# Patient Record
Sex: MALE | Race: OTHER | Employment: FULL TIME
[De-identification: names, ages, dates, MRNs, and addresses within clinical notes are randomized per-mention and may not be internally consistent; named-entity substitution may affect disease eponyms.]

---

## 2022-03-18 PROBLEM — E29.1 TESTICULAR HYPOFUNCTION: Status: ACTIVE | Noted: 2020-03-27

## 2022-03-19 PROBLEM — R53.82 CHRONIC FATIGUE: Status: ACTIVE | Noted: 2020-03-27

## 2022-03-19 PROBLEM — D22.9 NEVUS: Status: ACTIVE | Noted: 2020-03-27

## 2022-03-19 PROBLEM — F32.9 REACTIVE DEPRESSION: Status: ACTIVE | Noted: 2020-03-26

## 2022-03-19 PROBLEM — K59.4 LEVATOR SYNDROME: Status: ACTIVE | Noted: 2019-10-17

## 2022-03-19 PROBLEM — D23.5 DYSPLASTIC NEVUS OF TRUNK: Status: ACTIVE | Noted: 2020-01-31

## 2022-03-19 PROBLEM — N52.8 OTHER MALE ERECTILE DYSFUNCTION: Status: ACTIVE | Noted: 2020-03-27

## 2023-02-22 ENCOUNTER — OFFICE VISIT (OUTPATIENT)
Dept: FAMILY MEDICINE CLINIC | Facility: CLINIC | Age: 43
End: 2023-02-22
Payer: COMMERCIAL

## 2023-02-22 VITALS
OXYGEN SATURATION: 96 % | WEIGHT: 182.2 LBS | HEART RATE: 83 BPM | DIASTOLIC BLOOD PRESSURE: 84 MMHG | HEIGHT: 70 IN | BODY MASS INDEX: 26.08 KG/M2 | SYSTOLIC BLOOD PRESSURE: 131 MMHG | RESPIRATION RATE: 18 BRPM | TEMPERATURE: 98.7 F

## 2023-02-22 DIAGNOSIS — F32.9 REACTIVE DEPRESSION: ICD-10-CM

## 2023-02-22 DIAGNOSIS — K21.00 GASTRO-ESOPHAGEAL REFLUX DISEASE WITH ESOPHAGITIS, WITHOUT BLEEDING: ICD-10-CM

## 2023-02-22 DIAGNOSIS — L30.1 ECZEMA, DYSHIDROTIC: Primary | ICD-10-CM

## 2023-02-22 DIAGNOSIS — Z88.9 HX OF SEASONAL ALLERGIES: ICD-10-CM

## 2023-02-22 DIAGNOSIS — N52.8 OTHER MALE ERECTILE DYSFUNCTION: ICD-10-CM

## 2023-02-22 DIAGNOSIS — K59.4 LEVATOR SYNDROME: ICD-10-CM

## 2023-02-22 PROCEDURE — 99214 OFFICE O/P EST MOD 30 MIN: CPT | Performed by: FAMILY MEDICINE

## 2023-02-22 RX ORDER — TADALAFIL 5 MG/1
5 TABLET ORAL DAILY
Qty: 90 TABLET | Refills: 3 | Status: SHIPPED | OUTPATIENT
Start: 2023-02-22

## 2023-02-22 RX ORDER — CLOBETASOL PROPIONATE 0.5 MG/G
OINTMENT TOPICAL
Qty: 60 G | Refills: 3 | Status: SHIPPED | OUTPATIENT
Start: 2023-02-22

## 2023-02-22 RX ORDER — PREDNISONE 20 MG/1
TABLET ORAL
Qty: 30 TABLET | Refills: 0 | Status: SHIPPED | OUTPATIENT
Start: 2023-02-22

## 2023-02-22 RX ORDER — DIAZEPAM 2 MG/1
2 TABLET ORAL DAILY PRN
Qty: 30 TABLET | Refills: 1 | Status: SHIPPED | OUTPATIENT
Start: 2023-02-22 | End: 2024-02-17

## 2023-02-22 RX ORDER — ALBUTEROL SULFATE 90 UG/1
2 AEROSOL, METERED RESPIRATORY (INHALATION) EVERY 6 HOURS PRN
Qty: 18 G | Refills: 5 | Status: SHIPPED | OUTPATIENT
Start: 2023-02-22

## 2023-02-22 RX ORDER — ESOMEPRAZOLE MAGNESIUM 40 MG/1
40 CAPSULE, DELAYED RELEASE ORAL DAILY
Qty: 30 CAPSULE | Refills: 5 | Status: SHIPPED | OUTPATIENT
Start: 2023-02-22

## 2023-02-22 SDOH — ECONOMIC STABILITY: FOOD INSECURITY: WITHIN THE PAST 12 MONTHS, THE FOOD YOU BOUGHT JUST DIDN'T LAST AND YOU DIDN'T HAVE MONEY TO GET MORE.: NEVER TRUE

## 2023-02-22 SDOH — ECONOMIC STABILITY: FOOD INSECURITY: WITHIN THE PAST 12 MONTHS, YOU WORRIED THAT YOUR FOOD WOULD RUN OUT BEFORE YOU GOT MONEY TO BUY MORE.: NEVER TRUE

## 2023-02-22 SDOH — ECONOMIC STABILITY: INCOME INSECURITY: HOW HARD IS IT FOR YOU TO PAY FOR THE VERY BASICS LIKE FOOD, HOUSING, MEDICAL CARE, AND HEATING?: NOT HARD AT ALL

## 2023-02-22 SDOH — ECONOMIC STABILITY: HOUSING INSECURITY
IN THE LAST 12 MONTHS, WAS THERE A TIME WHEN YOU DID NOT HAVE A STEADY PLACE TO SLEEP OR SLEPT IN A SHELTER (INCLUDING NOW)?: NO

## 2023-02-22 ASSESSMENT — ENCOUNTER SYMPTOMS
COLOR CHANGE: 1
RESPIRATORY NEGATIVE: 1

## 2023-02-22 ASSESSMENT — PATIENT HEALTH QUESTIONNAIRE - PHQ9
SUM OF ALL RESPONSES TO PHQ QUESTIONS 1-9: 0
1. LITTLE INTEREST OR PLEASURE IN DOING THINGS: 0
SUM OF ALL RESPONSES TO PHQ QUESTIONS 1-9: 0
SUM OF ALL RESPONSES TO PHQ9 QUESTIONS 1 & 2: 0
2. FEELING DOWN, DEPRESSED OR HOPELESS: 0

## 2023-02-22 NOTE — PROGRESS NOTES
Ladell Scheuermann (:  1980) is a 40 y.o. male,Established patient, here for evaluation of the following chief complaint(s):  Rash        ASSESSMENT/PLAN:  1. Eczema, dyshidrotic  -     predniSONE (DELTASONE) 20 MG tablet; Take 60 mg for 5 days; 40 mg for 5 days and 20 mg for 5 days. , Disp-30 tablet, R-0Normal  -     clobetasol (TEMOVATE) 0.05 % ointment; Apply topically 3 times daily. , Disp-60 g, R-3, Normal  2. Levator syndrome  -     diazePAM (VALIUM) 2 MG tablet; Take 1 tablet by mouth daily as needed (levator ani syndrome) for up to 360 days. Max Daily Amount: 2 mg, Disp-30 tablet, R-1Normal    Increase potency of steroid cream  15 days of prednisone. Recheck in 3 weeks: Return for already has follow up. Subjective   SUBJECTIVE/OBJECTIVE:  HPI  Rash:  Went to [facility] and was told rash was caused by stress. Dyshidrosis is what he was told the rash was. Was given kenalog topical cream, does come and go but has never fully resolved. Hands burn, itch and sting. Levator Ani:  Taking valium 1-2 per week. (Long-standing history of anal/rectal pain. Full work-up done several years ago. Discovered to be levator ani syndrome. Spasms happen 1-4 times per month. Other than those times absolutely no pain. Resolve with Valium). Review of Systems   Constitutional: Negative. Respiratory: Negative. Cardiovascular: Negative. Skin:  Positive for color change and rash. Negative for wound. Objective   Physical Exam  Vitals and nursing note reviewed. Constitutional:       General: He is not in acute distress. Appearance: He is not ill-appearing or toxic-appearing. Cardiovascular:      Rate and Rhythm: Normal rate and regular rhythm. Skin:     Findings: Erythema and rash present. Comments: See pictures   Neurological:      Mental Status: He is alert.               Vitals:    23 1623   BP: 131/84   Pulse: 83   Resp: 18   Temp: 98.7 °F (37.1 °C)   SpO2: 96% On this date 2/22/2023 I have spent 30 minutes reviewing previous notes, test results and face to face with the patient discussing the diagnosis and importance of compliance with the treatment plan as well as documenting on the day of the visit. An electronic signature was used to authenticate this note.     --Kamila Lundberg MD

## 2023-03-13 ENCOUNTER — OFFICE VISIT (OUTPATIENT)
Dept: FAMILY MEDICINE CLINIC | Facility: CLINIC | Age: 43
End: 2023-03-13
Payer: COMMERCIAL

## 2023-03-13 VITALS
WEIGHT: 184.2 LBS | HEART RATE: 69 BPM | TEMPERATURE: 97.4 F | SYSTOLIC BLOOD PRESSURE: 116 MMHG | RESPIRATION RATE: 18 BRPM | BODY MASS INDEX: 26.37 KG/M2 | DIASTOLIC BLOOD PRESSURE: 85 MMHG | HEIGHT: 70 IN | OXYGEN SATURATION: 99 %

## 2023-03-13 DIAGNOSIS — F32.9 REACTIVE DEPRESSION: ICD-10-CM

## 2023-03-13 DIAGNOSIS — L30.1 ECZEMA, DYSHIDROTIC: ICD-10-CM

## 2023-03-13 DIAGNOSIS — K59.4 LEVATOR SYNDROME: ICD-10-CM

## 2023-03-13 DIAGNOSIS — Z00.00 WELL ADULT EXAM: Primary | ICD-10-CM

## 2023-03-13 PROCEDURE — 99396 PREV VISIT EST AGE 40-64: CPT | Performed by: FAMILY MEDICINE

## 2023-03-13 RX ORDER — CLOBETASOL PROPIONATE 0.5 MG/G
OINTMENT TOPICAL
Qty: 60 G | Refills: 3 | Status: SHIPPED | OUTPATIENT
Start: 2023-03-13

## 2023-03-13 RX ORDER — CLOBETASOL PROPIONATE 0.5 MG/G
OINTMENT TOPICAL
Qty: 60 G | Refills: 11 | Status: CANCELLED | OUTPATIENT
Start: 2023-03-13

## 2023-03-13 SDOH — ECONOMIC STABILITY: INCOME INSECURITY: HOW HARD IS IT FOR YOU TO PAY FOR THE VERY BASICS LIKE FOOD, HOUSING, MEDICAL CARE, AND HEATING?: NOT HARD AT ALL

## 2023-03-13 SDOH — ECONOMIC STABILITY: FOOD INSECURITY: WITHIN THE PAST 12 MONTHS, YOU WORRIED THAT YOUR FOOD WOULD RUN OUT BEFORE YOU GOT MONEY TO BUY MORE.: NEVER TRUE

## 2023-03-13 SDOH — ECONOMIC STABILITY: FOOD INSECURITY: WITHIN THE PAST 12 MONTHS, THE FOOD YOU BOUGHT JUST DIDN'T LAST AND YOU DIDN'T HAVE MONEY TO GET MORE.: NEVER TRUE

## 2023-03-13 ASSESSMENT — PATIENT HEALTH QUESTIONNAIRE - PHQ9
SUM OF ALL RESPONSES TO PHQ QUESTIONS 1-9: 0
SUM OF ALL RESPONSES TO PHQ QUESTIONS 1-9: 0
3. TROUBLE FALLING OR STAYING ASLEEP: 0
9. THOUGHTS THAT YOU WOULD BE BETTER OFF DEAD, OR OF HURTING YOURSELF: 0
6. FEELING BAD ABOUT YOURSELF - OR THAT YOU ARE A FAILURE OR HAVE LET YOURSELF OR YOUR FAMILY DOWN: 0
SUM OF ALL RESPONSES TO PHQ9 QUESTIONS 1 & 2: 0
SUM OF ALL RESPONSES TO PHQ QUESTIONS 1-9: 0
7. TROUBLE CONCENTRATING ON THINGS, SUCH AS READING THE NEWSPAPER OR WATCHING TELEVISION: 0
2. FEELING DOWN, DEPRESSED OR HOPELESS: 0
1. LITTLE INTEREST OR PLEASURE IN DOING THINGS: 0
SUM OF ALL RESPONSES TO PHQ QUESTIONS 1-9: 0
SUM OF ALL RESPONSES TO PHQ QUESTIONS 1-9: 0
10. IF YOU CHECKED OFF ANY PROBLEMS, HOW DIFFICULT HAVE THESE PROBLEMS MADE IT FOR YOU TO DO YOUR WORK, TAKE CARE OF THINGS AT HOME, OR GET ALONG WITH OTHER PEOPLE: 0
4. FEELING TIRED OR HAVING LITTLE ENERGY: 0
2. FEELING DOWN, DEPRESSED OR HOPELESS: 0
5. POOR APPETITE OR OVEREATING: 0
1. LITTLE INTEREST OR PLEASURE IN DOING THINGS: 0
8. MOVING OR SPEAKING SO SLOWLY THAT OTHER PEOPLE COULD HAVE NOTICED. OR THE OPPOSITE, BEING SO FIGETY OR RESTLESS THAT YOU HAVE BEEN MOVING AROUND A LOT MORE THAN USUAL: 0
SUM OF ALL RESPONSES TO PHQ9 QUESTIONS 1 & 2: 0
SUM OF ALL RESPONSES TO PHQ QUESTIONS 1-9: 0

## 2023-03-13 ASSESSMENT — ENCOUNTER SYMPTOMS
GASTROINTESTINAL NEGATIVE: 1
RESPIRATORY NEGATIVE: 1

## 2023-03-13 NOTE — PROGRESS NOTES
3/13/2023    Saeid Trotter (:  1980) is a 40 y.o. male, here for a preventive medicine evaluation. Underlying anxiety (now controlled) (s/p death of his first wife in 2019 from pulm ebolism); levator syndrome; eczema;     Doing well lately; Got  last year; new baby . COVID:  No infections. Declines vaccine at this time. Taking valium 1-2 per week. (Long-standing history of anal/rectal pain. Full work-up done several years ago. Discovered to be levator ani syndrome. Spasms happen 1-4 times per month. Other than those times absolutely no pain. Resolve with Valium). Allergies Seasonal  Allegra in the morning; Zyrtec at night. Occasional albuterol. Cholesterol checked at work; (he will bring us records)  WNL: LDL a little high at 103. BGL: wnl; Triglycerides; wnl. Patient Active Problem List   Diagnosis    Testicular hypofunction    Chronic fatigue    Nevus    Hx of seasonal allergies    Other male erectile dysfunction    Levator syndrome    Reactive depression    Dysplastic nevus of trunk    Muscle spasm    GI bleed    Eczema, dyshidrotic    Well adult exam       Review of Systems   Constitutional: Negative. Respiratory: Negative. Cardiovascular: Negative. Gastrointestinal: Negative. Musculoskeletal: Negative. Skin: Negative. Neurological:  Negative for headaches. Psychiatric/Behavioral: Negative. Prior to Visit Medications    Medication Sig Taking? Authorizing Provider   clobetasol (TEMOVATE) 0.05 % ointment Apply topically 3 times daily. Yes German Clark MD   diazePAM (VALIUM) 2 MG tablet Take 1 tablet by mouth daily as needed (levator ani syndrome) for up to 360 days.  Max Daily Amount: 2 mg Yes German Clark MD   tadalafil (CIALIS) 5 MG tablet Take 1 tablet by mouth daily Yes German Clark MD   esomeprazole (NEXIUM) 40 MG delayed release capsule Take 1 capsule by mouth daily Yes German Clark MD   albuterol sulfate HFA (PROVENTIL;VENTOLIN;PROAIR) 108 (90 Base) MCG/ACT inhaler Inhale 2 puffs into the lungs every 6 hours as needed for Wheezing Yes Patricia Murdock MD   cetirizine (ZYRTEC) 10 MG tablet Take by mouth Yes Ar Automatic Reconciliation   fexofenadine (ALLEGRA) 180 MG tablet Take by mouth daily Yes Ar Automatic Reconciliation   moxifloxacin (VIGAMOX) 0.5 % ophthalmic solution Apply 1 drop to eye 3 times daily Yes Ar Automatic Reconciliation        No Known Allergies    Past Medical History:   Diagnosis Date    Asthma     GI bleed     Hx of seasonal allergies     Muscle spasm        Past Surgical History:   Procedure Laterality Date    GI      hemorroidectomy, egd       Social History     Socioeconomic History    Marital status:      Spouse name: Not on file    Number of children: Not on file    Years of education: Not on file    Highest education level: Not on file   Occupational History    Not on file   Tobacco Use    Smoking status: Never    Smokeless tobacco: Never   Vaping Use    Vaping Use: Never used   Substance and Sexual Activity    Alcohol use: Yes     Alcohol/week: 1.0 standard drink    Drug use: Never    Sexual activity: Not on file     Comment:    Other Topics Concern    Not on file   Social History Narrative    Not on file     Social Determinants of Health     Financial Resource Strain: Low Risk     Difficulty of Paying Living Expenses: Not hard at all   Food Insecurity: No Food Insecurity    Worried About [address] in the Last Year: Never true    [address] in the Last Year: Never true   Transportation Needs: Unknown    Lack of Transportation (Medical): Not on file    Lack of Transportation (Non-Medical):  No   Physical Activity: Not on file   Stress: Not on file   Social Connections: Not on file   Intimate Partner Violence: Not on file   Housing Stability: Unknown    Unable to Pay for Housing in the Last Year: Not on file    Number of Places Lived in the Last Year: Not on file Unstable Housing in the Last Year: No        Family History   Problem Relation Age of Onset    No Known Problems Mother     No Known Problems Father     Cancer Maternal Grandfather         colon ca at age 72       ADVANCE DIRECTIVE: N, <no information>    Vitals:    03/13/23 1013   BP: 116/85   Pulse: 69   Resp: 18   Temp: 97.4 °F (36.3 °C)   TempSrc: Temporal   SpO2: 99%  Comment: room air sitting   Weight: 184 lb 3.2 oz (83.6 kg)   Height: 5' 10\" (1.778 m)     Estimated body mass index is 26.43 kg/m² as calculated from the following:    Height as of this encounter: 5' 10\" (1.778 m). Weight as of this encounter: 184 lb 3.2 oz (83.6 kg). Physical Exam  Vitals and nursing note reviewed. Constitutional:       General: He is not in acute distress. Appearance: He is not ill-appearing or toxic-appearing. HENT:      Head: Normocephalic and atraumatic. Cardiovascular:      Rate and Rhythm: Normal rate and regular rhythm. Pulmonary:      Effort: Pulmonary effort is normal.      Breath sounds: Normal breath sounds. Skin:     General: Skin is warm. Capillary Refill: Capillary refill takes less than 2 seconds. Neurological:      Mental Status: He is alert. Psychiatric:         Mood and Affect: Mood normal.         Behavior: Behavior normal.         Thought Content: Thought content normal.         Judgment: Judgment normal.       No flowsheet data found.     Lab Results   Component Value Date/Time    CHOL 154 12/03/2019 09:06 AM    TRIG 55 12/03/2019 09:06 AM    HDL 48 12/03/2019 09:06 AM    LDLCALC 95 12/03/2019 09:06 AM    GLUCOSE 83 12/03/2019 09:06 AM       The ASCVD Risk score (Gwyn KILGORE, et al., 2019) failed to calculate for the following reasons:    Cannot find a previous HDL lab    Cannot find a previous total cholesterol lab    Unable to determine if patient is Non-     Immunization History   Administered Date(s) Administered    DTaP (Infanrix) 1980, 03/03/1982, 10/07/1982, 06/19/1984, 08/07/1986    Hepatitis B Adult (Recombivax HB) 03/22/2005, 04/26/2005, 09/27/2005    Influenza Virus Vaccine 10/01/2019    MMR 06/14/1984    Polio IPV (IPOL) 1981, 03/03/1982, 06/14/1984, 06/19/1984, 08/07/1986       Health Maintenance   Topic Date Due    COVID-19 Vaccine (1) Never done    Varicella vaccine (1 of 2 - 2-dose childhood series) Never done    DTaP/Tdap/Td vaccine (6 - Tdap) 10/24/1992    HIV screen  Never done    Hepatitis C screen  Never done    Flu vaccine (1) 08/01/2022    Diabetes screen  03/13/2024 (Originally 10/24/2016)    Depression Monitoring  02/22/2024    Lipids  01/25/2027    Hepatitis A vaccine  Aged Out    Hib vaccine  Aged Out    Meningococcal (ACWY) vaccine  Aged Out    Pneumococcal 0-64 years Vaccine  Aged Out       Assessment & Plan   Well adult exam  Eczema, dyshidrotic  -     clobetasol (TEMOVATE) 0.05 % ointment; Apply topically 3 times daily. , Disp-60 g, R-3, Normal  Levator syndrome  Reactive depression      Plan:  No change in medication. Continue clobetasol as needed for eczema. He will get a copy of his labs from work and send them to us. Otherwise, no concerns.       Return in about 1 year (around 3/13/2024) for wait for new DrShraddha or give Drs list.         --Manuelito Reyes MD

## 2023-04-12 PROBLEM — Z00.00 WELL ADULT EXAM: Status: RESOLVED | Noted: 2023-03-13 | Resolved: 2023-04-12

## 2023-06-05 ENCOUNTER — NURSE TRIAGE (OUTPATIENT)
Dept: OTHER | Facility: CLINIC | Age: 43
End: 2023-06-05

## 2023-06-05 NOTE — TELEPHONE ENCOUNTER
Location of patient: [place]    Received call from [facility] at [facility] with [employer]. Subjective: Caller states \"diarrhea\"     Current Symptoms: diarrhea on/off hx acid reflux and pain feels bloated , no appetite , sharp pains on/off , feels like has lost weight , chills hx gi bleed also in the past feels is staying hydrated     Pt is un established     Onset: 7 days and has had loose stools for even longer     Associated Symptoms: NA    Pain Severity: 6/10    Temperature: unsure    What has been tried: peptobismal    LMP: NA Pregnant: NA    Recommended disposition: See in Office Today    Care advice provided, patient verbalizes understanding; denies any other questions or concerns; instructed to call back for any new or worsening symptoms. Patient/Caller agrees with recommended disposition; writer provided warm transfer to [facility] for appointment scheduling    Attention Provider: Thank you for allowing me to participate in the care of your patient. The patient was connected to triage in response to information provided to the ECC/PSC. Please do not respond through this encounter as the response is not directed to a shared pool.       Reason for Disposition   SEVERE diarrhea (e.g., 7 or more times / day more than normal) and present > 24 hours (1 day)    Protocols used: Diarrhea-ADULT-OH

## 2024-09-25 DIAGNOSIS — M25.561 RIGHT KNEE PAIN, UNSPECIFIED CHRONICITY: Primary | ICD-10-CM

## 2024-10-08 ENCOUNTER — TELEPHONE (OUTPATIENT)
Dept: ORTHOPEDIC SURGERY | Age: 44
End: 2024-10-08

## 2024-10-08 NOTE — TELEPHONE ENCOUNTER
Pt called and told he has a WC NP appt with Dr. De Los Santos on Monday. Please call pt to confirm

## 2024-10-08 NOTE — TELEPHONE ENCOUNTER
Called and LVM for pt to confirm that his appt will be 10/14 at 9:30am. Pt will need to be at [facility] at 8[facility].

## 2024-10-11 DIAGNOSIS — M25.561 RIGHT KNEE PAIN, UNSPECIFIED CHRONICITY: ICD-10-CM

## 2024-10-14 ENCOUNTER — OFFICE VISIT (OUTPATIENT)
Dept: ORTHOPEDIC SURGERY | Age: 44
End: 2024-10-14
Payer: COMMERCIAL

## 2024-10-14 VITALS — WEIGHT: 175 LBS | BODY MASS INDEX: 24.5 KG/M2 | HEIGHT: 71 IN

## 2024-10-14 DIAGNOSIS — M71.21 SYNOVIAL CYST OF RIGHT KNEE: ICD-10-CM

## 2024-10-14 DIAGNOSIS — S83.231A COMPLEX TEAR OF MEDIAL MENISCUS, CURRENT INJURY, RIGHT KNEE, INITIAL ENCOUNTER: Primary | ICD-10-CM

## 2024-10-14 DIAGNOSIS — M94.261 CHONDROMALACIA OF RIGHT KNEE: ICD-10-CM

## 2024-10-14 DIAGNOSIS — M22.41 CHONDROMALACIA OF RIGHT PATELLA: ICD-10-CM

## 2024-10-14 PROCEDURE — 99204 OFFICE O/P NEW MOD 45 MIN: CPT | Performed by: ORTHOPAEDIC SURGERY

## 2024-10-14 PROCEDURE — 99366 TEAM CONF W/PAT BY HC PROF: CPT | Performed by: ORTHOPAEDIC SURGERY

## 2024-10-14 RX ORDER — SILDENAFIL 50 MG/1
TABLET, FILM COATED ORAL
COMMUNITY

## 2024-10-14 RX ORDER — BACLOFEN 10 MG/1
TABLET ORAL
COMMUNITY
Start: 2024-09-01

## 2024-10-14 RX ORDER — BUPROPION HYDROCHLORIDE 150 MG/1
TABLET ORAL
COMMUNITY
Start: 2024-09-01

## 2024-10-14 NOTE — PROGRESS NOTES
Lachman,  negative anterior drawer,   negative posterior drawer  negative pivot.   Good tibial step-off,   No varus or valgus laxity at 0 or 30 degrees.   Negative lateral joint line tenderness   negative lateral Franklin.   Positive medial joint line tenderness  Positive medial Franklin.   No evidence of any posterolateral instability.   No patellofemoral pain.   Negative compression,   negative apprehension  no effusion.   Calves Are non-tender,  neurovascularly patient is intact.   Negative Homans.   MPFL is non-tender.   Patient Can fully extend the knee.   Good quad tone  No erythema.   Negative Dial test.            Data Reviewed:          XR: AP standing PA 45 degree weightbearing lateral and sunrise views both knees   Clinical Indication    ICD-10-CM    1. Complex tear of medial meniscus, current injury, right knee, initial encounter  S83.231A       2. Chondromalacia of right knee  M94.261       3. Chondromalacia of right patella  M22.41       4. Synovial cyst of right knee  M71.21          Report: AP standing PA 45 degree weightbearing lateral and sunrise views both knees demonstrate a preserved joint space in all 3 compartments.  No fracture.  No dislocation.    Impression: As above   RAPHAEL GARCIA JR, MD     MRI right knee dated 9/30/2024 demonstrates the ACL PCL and lateral meniscus to be intact.  There is a complex tear of the posterior horn of the medial meniscus.  There is a bone bruise in the medial femoral condyle.  There is chondromalacia in the medial and patellofemoral compartments.  There is a cyst posterior to the PCL           Impression:   1. Complex tear of medial meniscus, current injury, right knee, initial encounter    2. Chondromalacia of right knee    3. Chondromalacia of right patella    4. Synovial cyst of right knee           Plan:   I discussed the problem with the patient.  I discussed nonoperative versus operative intervention including injections.  In my opinion the patient is not

## 2024-10-17 ENCOUNTER — TELEPHONE (OUTPATIENT)
Dept: ORTHOPEDIC SURGERY | Age: 44
End: 2024-10-17

## 2024-10-22 DIAGNOSIS — M94.261 CHONDROMALACIA OF RIGHT KNEE: ICD-10-CM

## 2024-10-22 DIAGNOSIS — M22.41 CHONDROMALACIA OF RIGHT PATELLA: ICD-10-CM

## 2024-10-22 DIAGNOSIS — S83.231A COMPLEX TEAR OF MEDIAL MENISCUS, CURRENT INJURY, RIGHT KNEE, INITIAL ENCOUNTER: Primary | ICD-10-CM

## 2024-10-22 DIAGNOSIS — M71.21 SYNOVIAL CYST OF RIGHT KNEE: ICD-10-CM

## 2024-10-22 NOTE — PERIOP NOTE
Please drink 32 ounces of non-caffeinated clear liquids 2 hours prior to your arrival to avoid dehydration.        Patient verified name and .  Order for consent not found in EHR patient verifies procedure.   Type 1B surgery, Phone assessment complete.  Orders not received.  Labs per surgeon: none  Labs per anesthesia protocol: none    Patient answered medical/surgical history questions at their best of ability. All prior to admission medications documented in EPIC.    Patient instructed to continue taking all prescription medications up to the day of surgery but to take only the following medications the day of surgery according to anesthesia guidelines with a small sip of water: Albuterol (Ventolin/ Pro-air) use and bring, bupropion (Wellbutrin),  Also, patient is requested to take 2 Tylenol in the morning and then again before bed on the day before surgery. Regular or extra strength may be used.       Patient informed that all vitamins and supplements should be held 7 days prior to surgery and NSAIDS 5 days prior to surgery. Prescription meds to hold:None    Patient instructed on the following:    > Arrive at [facility], time of arrival to be called the day before by 1700  > NPO after midnight, unless otherwise indicated, including gum, mints, and ice chips  > Responsible adult must drive patient to the hospital, stay during surgery, and patient will need supervision 24 hours after anesthesia  > Use non moisturizing soap in shower the night before surgery and on the morning of surgery  > All piercings must be removed prior to arrival.    > Leave all valuables (money and jewelry) at home but bring insurance card and ID on DOS.   > You may be required to pay a deductible or co-pay on the day of your procedure. You can pre-pay by calling [phone] if your surgery is at the [facility] or [phone] if your surgery is at the [facility].  > Do not wear make-up, nail polish, lotions, cologne, perfumes,

## 2024-10-22 NOTE — H&P
Subjective:     Patient is a 43 y.o. MALE WITH RIGHT KNEE PAIN    SEE OFFICE NOTE.    Patient Active Problem List    Diagnosis Date Noted    Complex tear of medial meniscus of right knee as current injury 10/22/2024    Chondromalacia of right knee 10/22/2024    Chondromalacia of right patella 10/22/2024    Synovial cyst of right knee 10/22/2024    Eczema, dyshidrotic 03/13/2023    Testicular hypofunction 03/27/2020    Chronic fatigue 03/27/2020    Nevus 03/27/2020    Other male erectile dysfunction 03/27/2020    Reactive depression 03/26/2020    Dysplastic nevus of trunk 01/31/2020    Levator syndrome 10/17/2019    Hx of seasonal allergies     Muscle spasm     GI bleed      Past Medical History:   Diagnosis Date    Asthma     GI bleed     Hx of seasonal allergies     Muscle spasm       Past Surgical History:   Procedure Laterality Date    EYE SURGERY  2002    lasix    GI      hemorroidectomy, egd      Prior to Admission medications    Medication Sig Start Date End Date Taking? Authorizing Provider   baclofen (LIORESAL) 10 MG tablet  9/1/24   Mary Polanco MD   buPROPion (WELLBUTRIN XL) 150 MG extended release tablet  9/1/24   Mary Polanco MD   sildenafil (VIAGRA) 50 MG tablet TAKE ONE-HALF TO ONE TABLET BY MOUTH ONE TIME DAILY AS NEEDED FOR ERECTILE DYSFUNCTION USE IN ADDITION TO CIALIS 5MG DAILY    Mary Polanco MD   clobetasol (TEMOVATE) 0.05 % ointment Apply topically 3 times daily. 3/13/23   Robe Echeverria MD   tadalafil (CIALIS) 5 MG tablet Take 1 tablet by mouth daily 2/22/23   Robe Echeverria MD   esomeprazole (NEXIUM) 40 MG delayed release capsule Take 1 capsule by mouth daily 2/22/23   Robe Echeverria MD   albuterol sulfate HFA (PROVENTIL;VENTOLIN;PROAIR) 108 (90 Base) MCG/ACT inhaler Inhale 2 puffs into the lungs every 6 hours as needed for Wheezing 2/22/23   Robe Echeverria MD   cetirizine (ZYRTEC) 10 MG tablet Take by mouth    Automatic Reconciliation, Ar   fexofenadine (ALLEGRA)

## 2024-10-24 ENCOUNTER — PREP FOR PROCEDURE (OUTPATIENT)
Age: 44
End: 2024-10-24

## 2024-10-24 DIAGNOSIS — S83.231A COMPLEX TEAR OF MEDIAL MENISCUS, CURRENT INJURY, RIGHT KNEE, INITIAL ENCOUNTER: Primary | ICD-10-CM

## 2024-10-24 RX ORDER — SODIUM CHLORIDE 0.9 % (FLUSH) 0.9 %
5-40 SYRINGE (ML) INJECTION EVERY 12 HOURS SCHEDULED
Status: CANCELLED | OUTPATIENT
Start: 2024-10-24

## 2024-10-24 RX ORDER — SODIUM CHLORIDE 9 MG/ML
INJECTION, SOLUTION INTRAVENOUS PRN
Status: CANCELLED | OUTPATIENT
Start: 2024-10-24

## 2024-10-24 RX ORDER — SODIUM CHLORIDE 0.9 % (FLUSH) 0.9 %
5-40 SYRINGE (ML) INJECTION PRN
Status: CANCELLED | OUTPATIENT
Start: 2024-10-24

## 2024-10-24 NOTE — BRIEF OP NOTE
BRIEF OPERATIVE NOTE    Date of Procedure: 10/25/2024    Preoperative Diagnosis:  MEDIAL MENISCAL TEAR RIGHT KNEE      CHONDROMALACIA RIGHT KNEE      PATELLOFEMORAL CHONDROMALACIA RIGHT KNEE      GANGLION CYST RIGHT KNEE    Postoperative Diagnosis:  MEDIAL MENISCAL TEAR RIGHT KNEE      CHONDROMALACIA RIGHT KNEE            GANGLION CYST RIGHT KNEE    Procedure(s)   ARTHROSCOPY RIGHT KNEE PARTIAL MEDIAL MENISECTOMY, DEBRIDEMENT GANGLION CYST    Surgeons and Role:     * Allen De Los Santos Jr., MD - Primary         Assistant Staff: NONE    Surgical Staff:  Circulator: (Unknown)  Scrub Person First: (Unknown)  Scrub Person Second: (Unknown)      * Missing procedure start or end time(s) *     Anesthesia:  GENERAL    Estimated Blood Loss: 5 cc.          Complications: NONE      TOURNIQUET:  14 MINUTES    ALLEN DE LOS SANTOS JR, MD

## 2024-10-25 ENCOUNTER — ANESTHESIA EVENT (OUTPATIENT)
Dept: SURGERY | Age: 44
End: 2024-10-25

## 2024-10-25 ENCOUNTER — ANESTHESIA (OUTPATIENT)
Dept: SURGERY | Age: 44
End: 2024-10-25

## 2024-10-25 ENCOUNTER — HOSPITAL ENCOUNTER (OUTPATIENT)
Age: 44
Setting detail: OUTPATIENT SURGERY
Discharge: HOME | End: 2024-10-25
Attending: ORTHOPAEDIC SURGERY | Admitting: ORTHOPAEDIC SURGERY

## 2024-10-25 VITALS
TEMPERATURE: 97.8 F | SYSTOLIC BLOOD PRESSURE: 122 MMHG | OXYGEN SATURATION: 98 % | RESPIRATION RATE: 16 BRPM | WEIGHT: 175.04 LBS | BODY MASS INDEX: 24.51 KG/M2 | HEIGHT: 71 IN | DIASTOLIC BLOOD PRESSURE: 75 MMHG | HEART RATE: 88 BPM

## 2024-10-25 DIAGNOSIS — S83.231A COMPLEX TEAR OF MEDIAL MENISCUS, CURRENT INJURY, RIGHT KNEE, INITIAL ENCOUNTER: ICD-10-CM

## 2024-10-25 PROBLEM — M71.21 SYNOVIAL CYST OF RIGHT KNEE: Status: RESOLVED | Noted: 2024-10-22 | Resolved: 2024-10-25

## 2024-10-25 PROBLEM — M67.461 GANGLION OF RIGHT KNEE: Status: ACTIVE | Noted: 2024-10-25

## 2024-10-25 PROCEDURE — 6370000000 HC RX 637 (ALT 250 FOR IP): Performed by: SURGERY

## 2024-10-25 PROCEDURE — 7100000010 HC PHASE II RECOVERY - FIRST 15 MIN: Performed by: ORTHOPAEDIC SURGERY

## 2024-10-25 PROCEDURE — 3600000014 HC SURGERY LEVEL 4 ADDTL 15MIN: Performed by: ORTHOPAEDIC SURGERY

## 2024-10-25 PROCEDURE — 6360000002 HC RX W HCPCS: Performed by: ORTHOPAEDIC SURGERY

## 2024-10-25 PROCEDURE — 6360000002 HC RX W HCPCS: Performed by: ANESTHESIOLOGY

## 2024-10-25 PROCEDURE — 3600000004 HC SURGERY LEVEL 4 BASE: Performed by: ORTHOPAEDIC SURGERY

## 2024-10-25 PROCEDURE — 3700000001 HC ADD 15 MINUTES (ANESTHESIA): Performed by: ORTHOPAEDIC SURGERY

## 2024-10-25 PROCEDURE — 7100000000 HC PACU RECOVERY - FIRST 15 MIN: Performed by: ORTHOPAEDIC SURGERY

## 2024-10-25 PROCEDURE — 29881 ARTHRS KNE SRG MNISECTMY M/L: CPT | Performed by: ORTHOPAEDIC SURGERY

## 2024-10-25 PROCEDURE — 2500000003 HC RX 250 WO HCPCS: Performed by: NURSE ANESTHETIST, CERTIFIED REGISTERED

## 2024-10-25 PROCEDURE — 6360000002 HC RX W HCPCS: Performed by: NURSE ANESTHETIST, CERTIFIED REGISTERED

## 2024-10-25 PROCEDURE — 7100000001 HC PACU RECOVERY - ADDTL 15 MIN: Performed by: ORTHOPAEDIC SURGERY

## 2024-10-25 PROCEDURE — 2580000003 HC RX 258: Performed by: ORTHOPAEDIC SURGERY

## 2024-10-25 PROCEDURE — 7100000011 HC PHASE II RECOVERY - ADDTL 15 MIN: Performed by: ORTHOPAEDIC SURGERY

## 2024-10-25 PROCEDURE — 2709999900 HC NON-CHARGEABLE SUPPLY: Performed by: ORTHOPAEDIC SURGERY

## 2024-10-25 PROCEDURE — 3700000000 HC ANESTHESIA ATTENDED CARE: Performed by: ORTHOPAEDIC SURGERY

## 2024-10-25 RX ORDER — FENTANYL CITRATE 50 UG/ML
100 INJECTION, SOLUTION INTRAMUSCULAR; INTRAVENOUS
Status: DISCONTINUED | OUTPATIENT
Start: 2024-10-25 | End: 2024-10-25 | Stop reason: HOSPADM

## 2024-10-25 RX ORDER — FENTANYL CITRATE 50 UG/ML
INJECTION, SOLUTION INTRAMUSCULAR; INTRAVENOUS
Status: DISCONTINUED | OUTPATIENT
Start: 2024-10-25 | End: 2024-10-25 | Stop reason: SDUPTHER

## 2024-10-25 RX ORDER — PROPOFOL 10 MG/ML
INJECTION, EMULSION INTRAVENOUS
Status: DISCONTINUED | OUTPATIENT
Start: 2024-10-25 | End: 2024-10-25 | Stop reason: SDUPTHER

## 2024-10-25 RX ORDER — KETAMINE HCL IN NACL, ISO-OSM 20 MG/2 ML
SYRINGE (ML) INJECTION
Status: DISCONTINUED | OUTPATIENT
Start: 2024-10-25 | End: 2024-10-25 | Stop reason: SDUPTHER

## 2024-10-25 RX ORDER — SODIUM CHLORIDE 9 MG/ML
INJECTION, SOLUTION INTRAVENOUS PRN
Status: DISCONTINUED | OUTPATIENT
Start: 2024-10-25 | End: 2024-10-25 | Stop reason: HOSPADM

## 2024-10-25 RX ORDER — SODIUM CHLORIDE 0.9 % (FLUSH) 0.9 %
5-40 SYRINGE (ML) INJECTION EVERY 12 HOURS SCHEDULED
Status: DISCONTINUED | OUTPATIENT
Start: 2024-10-25 | End: 2024-10-25

## 2024-10-25 RX ORDER — LIDOCAINE HYDROCHLORIDE 20 MG/ML
INJECTION, SOLUTION EPIDURAL; INFILTRATION; INTRACAUDAL; PERINEURAL
Status: DISCONTINUED | OUTPATIENT
Start: 2024-10-25 | End: 2024-10-25 | Stop reason: SDUPTHER

## 2024-10-25 RX ORDER — MIDAZOLAM HYDROCHLORIDE 2 MG/2ML
2 INJECTION, SOLUTION INTRAMUSCULAR; INTRAVENOUS
Status: DISCONTINUED | OUTPATIENT
Start: 2024-10-25 | End: 2024-10-25 | Stop reason: HOSPADM

## 2024-10-25 RX ORDER — SODIUM CHLORIDE 9 MG/ML
INJECTION, SOLUTION INTRAVENOUS PRN
Status: DISCONTINUED | OUTPATIENT
Start: 2024-10-25 | End: 2024-10-25

## 2024-10-25 RX ORDER — DEXAMETHASONE SODIUM PHOSPHATE 10 MG/ML
INJECTION INTRAMUSCULAR; INTRAVENOUS
Status: DISCONTINUED | OUTPATIENT
Start: 2024-10-25 | End: 2024-10-25 | Stop reason: SDUPTHER

## 2024-10-25 RX ORDER — SODIUM CHLORIDE 0.9 % (FLUSH) 0.9 %
5-40 SYRINGE (ML) INJECTION PRN
Status: DISCONTINUED | OUTPATIENT
Start: 2024-10-25 | End: 2024-10-25 | Stop reason: HOSPADM

## 2024-10-25 RX ORDER — NALOXONE HYDROCHLORIDE 0.4 MG/ML
INJECTION, SOLUTION INTRAMUSCULAR; INTRAVENOUS; SUBCUTANEOUS PRN
Status: DISCONTINUED | OUTPATIENT
Start: 2024-10-25 | End: 2024-10-25 | Stop reason: HOSPADM

## 2024-10-25 RX ORDER — SODIUM CHLORIDE, SODIUM LACTATE, POTASSIUM CHLORIDE, CALCIUM CHLORIDE 600; 310; 30; 20 MG/100ML; MG/100ML; MG/100ML; MG/100ML
INJECTION, SOLUTION INTRAVENOUS CONTINUOUS
Status: DISCONTINUED | OUTPATIENT
Start: 2024-10-25 | End: 2024-10-25 | Stop reason: HOSPADM

## 2024-10-25 RX ORDER — DEXAMETHASONE SODIUM PHOSPHATE 10 MG/ML
INJECTION INTRAMUSCULAR; INTRAVENOUS PRN
Status: DISCONTINUED | OUTPATIENT
Start: 2024-10-25 | End: 2024-10-25 | Stop reason: ALTCHOICE

## 2024-10-25 RX ORDER — OXYCODONE HYDROCHLORIDE 5 MG/1
5 TABLET ORAL
Status: DISCONTINUED | OUTPATIENT
Start: 2024-10-25 | End: 2024-10-25 | Stop reason: HOSPADM

## 2024-10-25 RX ORDER — ROPIVACAINE HYDROCHLORIDE 5 MG/ML
INJECTION, SOLUTION EPIDURAL; INFILTRATION; PERINEURAL PRN
Status: DISCONTINUED | OUTPATIENT
Start: 2024-10-25 | End: 2024-10-25 | Stop reason: ALTCHOICE

## 2024-10-25 RX ORDER — OXYCODONE HYDROCHLORIDE 10 MG/1
10 TABLET ORAL EVERY 6 HOURS PRN
Qty: 28 TABLET | Refills: 0 | Status: SHIPPED | OUTPATIENT
Start: 2024-10-25 | End: 2024-11-01

## 2024-10-25 RX ORDER — KETOROLAC TROMETHAMINE 10 MG/1
TABLET, FILM COATED ORAL
Qty: 20 TABLET | Refills: 0 | Status: SHIPPED | OUTPATIENT
Start: 2024-10-25

## 2024-10-25 RX ORDER — PROMETHAZINE HYDROCHLORIDE 25 MG/1
25 TABLET ORAL EVERY 6 HOURS PRN
Qty: 20 TABLET | Refills: 0 | Status: SHIPPED | OUTPATIENT
Start: 2024-10-25

## 2024-10-25 RX ORDER — SODIUM CHLORIDE 0.9 % (FLUSH) 0.9 %
5-40 SYRINGE (ML) INJECTION EVERY 12 HOURS SCHEDULED
Status: DISCONTINUED | OUTPATIENT
Start: 2024-10-25 | End: 2024-10-25 | Stop reason: HOSPADM

## 2024-10-25 RX ORDER — LIDOCAINE HYDROCHLORIDE 10 MG/ML
1 INJECTION, SOLUTION INFILTRATION; PERINEURAL
Status: DISCONTINUED | OUTPATIENT
Start: 2024-10-25 | End: 2024-10-25 | Stop reason: HOSPADM

## 2024-10-25 RX ORDER — SODIUM CHLORIDE 0.9 % (FLUSH) 0.9 %
5-40 SYRINGE (ML) INJECTION PRN
Status: DISCONTINUED | OUTPATIENT
Start: 2024-10-25 | End: 2024-10-25

## 2024-10-25 RX ORDER — MIDAZOLAM HYDROCHLORIDE 1 MG/ML
INJECTION, SOLUTION INTRAMUSCULAR; INTRAVENOUS
Status: DISCONTINUED | OUTPATIENT
Start: 2024-10-25 | End: 2024-10-25 | Stop reason: SDUPTHER

## 2024-10-25 RX ORDER — ONDANSETRON 2 MG/ML
INJECTION INTRAMUSCULAR; INTRAVENOUS
Status: DISCONTINUED | OUTPATIENT
Start: 2024-10-25 | End: 2024-10-25 | Stop reason: SDUPTHER

## 2024-10-25 RX ORDER — PROCHLORPERAZINE EDISYLATE 5 MG/ML
5 INJECTION INTRAMUSCULAR; INTRAVENOUS
Status: DISCONTINUED | OUTPATIENT
Start: 2024-10-25 | End: 2024-10-25 | Stop reason: HOSPADM

## 2024-10-25 RX ORDER — ACETAMINOPHEN 500 MG
1000 TABLET ORAL ONCE
Status: COMPLETED | OUTPATIENT
Start: 2024-10-25 | End: 2024-10-25

## 2024-10-25 RX ADMIN — Medication 20 MG: at 10:14

## 2024-10-25 RX ADMIN — FENTANYL CITRATE 75 MCG: 50 INJECTION, SOLUTION INTRAMUSCULAR; INTRAVENOUS at 10:25

## 2024-10-25 RX ADMIN — LIDOCAINE HYDROCHLORIDE 80 MG: 20 INJECTION, SOLUTION EPIDURAL; INFILTRATION; INTRACAUDAL; PERINEURAL at 10:16

## 2024-10-25 RX ADMIN — ACETAMINOPHEN 1000 MG: 500 TABLET, FILM COATED ORAL at 09:48

## 2024-10-25 RX ADMIN — ONDANSETRON 4 MG: 2 INJECTION, SOLUTION INTRAMUSCULAR; INTRAVENOUS at 10:25

## 2024-10-25 RX ADMIN — HYDROMORPHONE HYDROCHLORIDE 0.5 MG: 1 INJECTION, SOLUTION INTRAMUSCULAR; INTRAVENOUS; SUBCUTANEOUS at 11:10

## 2024-10-25 RX ADMIN — CEFAZOLIN 2000 MG: 2 INJECTION, POWDER, FOR SOLUTION INTRAMUSCULAR; INTRAVENOUS at 10:21

## 2024-10-25 RX ADMIN — MIDAZOLAM 2 MG: 1 INJECTION INTRAMUSCULAR; INTRAVENOUS at 10:14

## 2024-10-25 RX ADMIN — FENTANYL CITRATE 25 MCG: 50 INJECTION, SOLUTION INTRAMUSCULAR; INTRAVENOUS at 10:18

## 2024-10-25 RX ADMIN — PROPOFOL 170 MG: 10 INJECTION, EMULSION INTRAVENOUS at 10:16

## 2024-10-25 RX ADMIN — DEXAMETHASONE SODIUM PHOSPHATE 10 MG: 10 INJECTION INTRAMUSCULAR; INTRAVENOUS at 10:25

## 2024-10-25 ASSESSMENT — PAIN SCALES - GENERAL
PAINLEVEL_OUTOF10: 5
PAINLEVEL_OUTOF10: 8
PAINLEVEL_OUTOF10: 3
PAINLEVEL_OUTOF10: 5
PAINLEVEL_OUTOF10: 7

## 2024-10-25 ASSESSMENT — PAIN DESCRIPTION - DESCRIPTORS
DESCRIPTORS: SORE
DESCRIPTORS: SHARP

## 2024-10-25 ASSESSMENT — PAIN - FUNCTIONAL ASSESSMENT: PAIN_FUNCTIONAL_ASSESSMENT: 0-10

## 2024-10-25 NOTE — H&P
Update History & Physical    The patient's History and Physical of October 14, 2024 was reviewed with the patient and I examined the patient. There was no change. The surgical site was confirmed by the patient and me.       Plan: The risks, benefits, expected outcome, and alternative to the recommended procedure have been discussed with the patient. Patient understands and wants to proceed with the procedure.     Electronically signed by RAPHAEL GARCIA JR, MD on 10/25/2024 at 5:44 AM

## 2024-10-25 NOTE — DISCHARGE INSTR - COC
Continuity of Care Form    Patient Name: Selvin Fierro   :  1980  MRN:  [id number]    Admit date:  10/25/2024  Discharge date:  ***    Code Status Order: Full Code   Advance Directives:   Advance Care Flowsheet Documentation        Date/Time Healthcare Directive Type of Healthcare Directive Copy in Chart Healthcare Agent Appointed Healthcare Agent's Name Healthcare Agent's Phone Number    10/25/24 0922 Yes, patient has an advance directive for healthcare treatment  Health care treatment directive;Living will  No, copy requested from family  --  --  --                     Admitting Physician:  Allen De Los Santos Jr., MD  PCP: Robe Echeverria MD    Discharging Nurse: ***  Discharging Hospital Unit/Room#: [facility]/PL  Discharging Unit Phone Number: ***    Emergency Contact:   Extended Emergency Contact Information  Primary Emergency Contact: Leonora Fierro  Mobile Phone: [phone]  Relation: Spouse    Past Surgical History:  Past Surgical History:   Procedure Laterality Date    APPENDECTOMY      EYE SURGERY  2002    lasix    GI      hemorroidectomy, egd       Immunization History:   Immunization History   Administered Date(s) Administered    DTaP, INFANRIX, (age 6w-6y), IM, 0.5mL 1981, 1982, 10/07/1982, 1984, 1986    Hep B, RECOMBIVAX-HB, (age 20y+), IM, 1mL 2005, 2005, 2005    Influenza Virus Vaccine 10/01/2019    MMR, PRIORIX, M-M-R II, (age 12m+), SC, 0.5mL 1984    Poliovirus, IPOL, (age 6w+), SC/IM, 0.5mL 1981, 1982, 1984, 1984, 1986       Active Problems:  Patient Active Problem List   Diagnosis Code    Testicular hypofunction E29.1    Chronic fatigue R53.82    Nevus D22.9    Hx of seasonal allergies Z88.9    Other male erectile dysfunction N52.8    Levator syndrome K59.4    Reactive depression F32.9    Dysplastic nevus of trunk D23.5    Muscle spasm M62.838    GI bleed K92.2    Eczema, dyshidrotic L30.1    Complex tear of medial  meniscus of right knee as current injury S83.231A    Chondromalacia of right knee M94.261    Chondromalacia of right patella M22.41    Complex tear of medial meniscus, current injury, right knee, initial encounter S83.231A    Ganglion of right knee M67.461       Isolation/Infection:   Isolation            No Isolation          Patient Infection Status       None to display            Nurse Assessment:  Last Vital Signs: /75   Pulse 88   Temp 97.8 °F (36.6 °C) (Temporal)   Resp 16   Ht 1.803 m (5' 10.98\")   Wt 79.4 kg (175 lb 0.7 oz)   SpO2 98%   BMI 24.42 kg/m²     Last documented pain score (0-10 scale): Pain Level: 5  Last Weight:   Wt Readings from Last 1 Encounters:   10/25/24 79.4 kg (175 lb 0.7 oz)     Mental Status:  {IP PT MENTAL STATUS:20030}    IV Access:  { CASA IV ACCESS:276788227}    Nursing Mobility/ADLs:  Walking   {CHP DME ADLs:850068649}  Transfer  {CHP DME ADLs:887704062}  Bathing  {CHP DME ADLs:476973342}  Dressing  {CHP DME ADLs:168259538}  Toileting  {CHP DME ADLs:854499683}  Feeding  {P DME ADLs:092921552}  Med Admin  {P DME ADLs:298981925}  Med Delivery   { CASA MED Delivery:241790927}    Wound Care Documentation and Therapy:  Incision 10/25/24 Knee Right (Active)   Dressing Status Clean;Dry;Intact 10/25/24 1138   Dressing/Treatment Gauze dressing/dressing sponge;Ace wrap 10/25/24 1138   Drainage Amount None (dry) 10/25/24 1138   Number of days: 0        Elimination:  Continence:   Bowel: {YES / NO:19727}  Bladder: {YES / NO:19727}  Urinary Catheter: {Urinary Catheter:714065673}   Colostomy/Ileostomy/Ileal Conduit: {YES / NO:19727}       Date of Last BM: ***    Intake/Output Summary (Last 24 hours) at 10/25/2024 1213  Last data filed at 10/25/2024 1036  Gross per 24 hour   Intake --   Output 5 ml   Net -5 ml     No intake/output data recorded.    Safety Concerns:     { CASA Safety Concerns:394815815}    Impairments/Disabilities:      { CASA  {[facility]:275029757}

## 2024-10-25 NOTE — PERIOP NOTE
MD Mappus at bedside with patient. Pt VSS stable. Pain and Nausea controlled at this time. Verbal sign out per MD when pacu care is completed. Plan of care continues.

## 2024-10-25 NOTE — OP NOTE
[facility] & [address]  [address]                            OPERATIVE REPORT      PATIENT NAME: MYRIAM GROSS            : 1980  MED REC NO: [id number]                       ROOM: [facility] [place] NO: [id number]                       ADMIT DATE: 10/25/2024  PROVIDER: Allen De Los Santos Jr, MD    DATE OF SERVICE:  10/25/2024    PREOPERATIVE DIAGNOSES:       1. Medial meniscal tear, right knee.     2. Chondromalacia, right knee.     3. Patellofemoral chondromalacia, right knee.     4. Ganglion cyst, right knee.    POSTOPERATIVE DIAGNOSES:       1. Medial meniscal tear, right knee.     2. Chondromalacia, right knee.     3. Ganglion cyst, right knee.    PROCEDURES PERFORMED:  Arthroscopy of right knee, partial medial meniscectomy, debridement of ganglion cyst.    SURGEON:  Allen De Los Santos Jr, MD      ANESTHESIA:  General.    ESTIMATED BLOOD LOSS:  5 mL.        INTRAOPERATIVE FINDINGS:  Pathology:     1. Complex medial meniscal tear.     2. Grade 1 chondromalacia of the medial femoral condyle and medial tibial plateau.     3. Ganglion cyst in the ACL.     COMPLICATIONS:  None.        INDICATIONS:  The patient is a 44-year-old gentleman who injured his right knee on the job.  Preoperative physical exam, radiographs, and an MRI demonstrated medial meniscal tear of right knee, chondromalacia of right knee, patellofemoral chondromalacia of right knee and a ganglion cyst in the ACL.  The patient has exhausted nonoperative modalities and electively admitted for operative intervention.    DESCRIPTION OF PROCEDURE:  Following identification of the patient, the patient was taken to the operative suite.  Following induction of general anesthesia and administration of 2 g of IV Ancef, the patient was very carefully positioned on the operating table in the supine fashion.  Right knee was examined under anesthesia and noted to be stable throughout full range of

## 2024-10-25 NOTE — ANESTHESIA POSTPROCEDURE EVALUATION
Department of Anesthesiology  Postprocedure Note    Patient: Selvin Fierro  MRN: [id number]  YOB: 1980  Date of evaluation: 10/25/2024    Procedure Summary       Date: 10/25/24 Room / Location: [facility] MAIN OR  / [facility] MAIN OR    Anesthesia Start: 1011 Anesthesia Stop: 1057    Procedure: ARTHROSCOPY RIGHT KNEE PARTIAL MEDIAL MENISECTOMY (Right: Knee) Diagnosis:       Complex tear of medial meniscus of right knee as current injury, initial encounter      Chondromalacia of right knee      Chondromalacia of right patella      Synovial cyst of right knee      (Complex tear of medial meniscus of right knee as current injury, initial encounter [S83.231A])      (Chondromalacia of right knee [M94.261])      (Chondromalacia of right patella [M22.41])      (Synovial cyst of right knee [M71.21])    Surgeons: Allen De Los Santos Jr., MD Responsible Provider: Wilfrido Blackwood MD    Anesthesia Type: General ASA Status: 2            Anesthesia Type: General    Vandana Phase I: Vandana Score: 10    Vandana Phase II: Vandana Score: 10    Anesthesia Post Evaluation    Patient location during evaluation: PACU  Patient participation: complete - patient participated  Level of consciousness: awake and alert  Airway patency: patent  Nausea & Vomiting: no nausea and no vomiting  Cardiovascular status: hemodynamically stable  Respiratory status: acceptable, nonlabored ventilation and spontaneous ventilation  Hydration status: euvolemic  Comments: /75   Pulse 88   Temp 97.8 °F (36.6 °C) (Temporal)   Resp 16   Ht 1.803 m (5' 10.98\")   Wt 79.4 kg (175 lb 0.7 oz)   SpO2 98%   BMI 24.42 kg/m²     Multimodal analgesia pain management approach  Pain management: adequate and satisfactory to patient    No notable events documented.

## 2024-10-25 NOTE — ANESTHESIA PRE PROCEDURE
Muscle spasm        Past Surgical History:        Procedure Laterality Date    APPENDECTOMY      EYE SURGERY  2002    lasix    GI      hemorroidectomy, egd       Social History:    Social History     Tobacco Use    Smoking status: Never    Smokeless tobacco: Never   Substance Use Topics    Alcohol use: Yes     Alcohol/week: 1.0 standard drink of alcohol     Comment: daily                                Counseling given: Not Answered      Vital Signs (Current):   Vitals:    10/22/24 1311 10/25/24 0909   BP:  (!) 140/90   Pulse:  80   Resp:  18   Temp:  97.7 °F (36.5 °C)   TempSrc:  Temporal   SpO2:  99%   Weight: 79.4 kg (175 lb) 79.4 kg (175 lb 0.7 oz)   Height: 1.803 m (5' 11\") 1.803 m (5' 10.98\")                                              BP Readings from Last 3 Encounters:   10/25/24 (!) 140/90   03/13/23 116/85   02/22/23 131/84       NPO Status: Time of last liquid consumption: 0630 (32 oz water)                        Time of last solid consumption: 2230                        Date of last liquid consumption: 10/25/24                        Date of last solid food consumption: 10/24/24    BMI:   Wt Readings from Last 3 Encounters:   10/25/24 79.4 kg (175 lb 0.7 oz)   10/14/24 79.4 kg (175 lb)   03/13/23 83.6 kg (184 lb 3.2 oz)     Body mass index is 24.42 kg/m².    CBC:   Lab Results   Component Value Date/Time    WBC 6.4 12/03/2019 09:06 AM    RBC 5.47 12/03/2019 09:06 AM    HGB 15.7 12/03/2019 09:06 AM    HCT 46.5 12/03/2019 09:06 AM    MCV 85 12/03/2019 09:06 AM    RDW 12.4 12/03/2019 09:06 AM     12/03/2019 09:06 AM       CMP:   Lab Results   Component Value Date/Time     12/03/2019 09:06 AM    K 4.9 12/03/2019 09:06 AM     12/03/2019 09:06 AM    CO2 26 12/03/2019 09:06 AM    BUN 11 12/03/2019 09:06 AM    CREATININE 1.04 12/03/2019 09:06 AM    GFRAA 105 12/03/2019 09:06 AM    AGRATIO 3.4 12/03/2019 09:06 AM    GLUCOSE 83 12/03/2019 09:06 AM    CALCIUM 9.9 12/03/2019 09:06 AM

## 2024-10-25 NOTE — DISCHARGE INSTRUCTIONS
INSTRUCTIONS FOLLOWING ARTHROSCOPY SURGERY  Dr. De Los Santos [phone]    ACTIVITY   As tolerated and as directed by your doctor   Elevate surgery site first 48 hours.   Use arm sling or crutches per your doctor's instructions.   Bathe or shower as directed by your doctor.    DIET   Clear liquids until no nausea or vomiting; then light diet for the first day   Advance to regular diet on second day, unless your doctor orders otherwise.   If nausea and vomiting continues, call your doctor.    PAIN   Take pain medication as directed by your doctor.   Call your doctor if pain is NOT relieved by medication.   DO NOT take aspirin or blood thinners until directed by your doctor.    DRESSING CARE: Follow all dressing care instructions provided by Dr. De Los Santos    FOLLOW-UP PHONE CALLS   Someone from Dr De Los Santos's office will call tomorrow with further instructions.   If you have any problems or concerns, call your doctor as needed.    CALL YOUR DOCTOR IF   Excessive bleeding that does not stop after holding mild pressure over the area   Temperature of 101°F or above   Redness, excessive swelling or bruising, and/or green or yellow, smelly discharge from incision    MEDICATION INTERACTION:    During your procedure you potentially received a medication or medications which may reduce the effectiveness of oral contraceptives. Please consider other forms of contraception for 1 month following your procedure if you are currently using oral contraceptives as your primary form of birth control. In addition to this, we recommend continuing your oral contraceptive as prescribed, unless otherwise instructed by your physician, during this time.    After general anesthesia or intravenous sedation, for 24 hours or while taking prescription Narcotics:  Limit your activities  A responsible adult needs to be with you for the next 24 hours  Do not drive and operate hazardous machinery  Do not make important personal or business decisions  Do not drink  alcoholic beverages  If you have not urinated within 8 hours after discharge, and you are experiencing discomfort from urinary retention, please go to the nearest ED.  If you have sleep apnea and have a CPAP machine, please use it for all naps and sleeping.  Please use caution when taking narcotics and any of your home medications that may cause drowsiness.  *  Please give a list of your current medications to your Primary Care Provider.  *  Please update this list whenever your medications are discontinued, doses are      changed, or new medications (including over-the-counter products) are added.  *  Please carry medication information at all times in case of emergency situations.    These are general instructions for a healthy lifestyle:  No smoking/ No tobacco products/ Avoid exposure to second hand smoke  Surgeon General's Warning:  Quitting smoking now greatly reduces serious risk to your health.  Obesity, smoking, and sedentary lifestyle greatly increases your risk for illness  A healthy diet, regular physical exercise & weight monitoring are important for maintaining a healthy lifestyle    You may be retaining fluid if you have a history of heart failure or if you experience any of the following symptoms:  Weight gain of 3 pounds or more overnight or 5 pounds in a week, increased swelling in our hands or feet or shortness of breath while lying flat in bed.  Please call your doctor as soon as you notice any of these symptoms; do not wait until your next office visit.

## 2024-10-28 NOTE — FLOWSHEET NOTE
He has not yet heard from Dr. De Los Santos's office. The patient had questions about when he can return to work. He states his nurse  is going to call the office today.

## 2024-10-29 ENCOUNTER — TELEPHONE (OUTPATIENT)
Dept: ORTHOPEDIC SURGERY | Age: 44
End: 2024-10-29

## 2024-10-29 NOTE — TELEPHONE ENCOUNTER
Called and spoke to pt to let him know that his RTW will be accessible via [employer]. Pt voiced understanding. Appt scheduled for 11/6 at 1pm.

## 2024-10-29 NOTE — TELEPHONE ENCOUNTER
He is wanting to know if he can get a note to return to work on Nov 4. He is an  and says he can go back. He says he has a post op on Nov 6 but I do not see him on the schedule.    He is asking for a return call to discuss the note and when he is to be seen by Dr. De Los Santos.

## 2024-11-06 ENCOUNTER — OFFICE VISIT (OUTPATIENT)
Age: 44
End: 2024-11-06

## 2024-11-06 DIAGNOSIS — Z48.89 ENCOUNTER FOR POSTOPERATIVE CARE: Primary | ICD-10-CM

## 2024-11-06 NOTE — PROGRESS NOTES
Name: Selvin Fierro  YOB: 1980  Gender: male  MRN: [id number]        HPI: Selvin Fierro is a 44 y.o. male 2 weeks status post arthroscopy right knee partial medial meniscectomy debridement of a ganglion cyst.  Operative findings were notable for chondromalacia right knee.  He returns and is doing fantastic.      ROS/Meds/PSH/PMH/FH/SH: A ten system review of systems was performed and is negative other than what is in the HPI.   Tobacco:  reports that he has never smoked. He has never used smokeless tobacco.  There were no vitals taken for this visit.     Physical Examination:  He is an awake alert pleasant gentleman ambulating with an antalgic gait    The left knee has a range of motion of 0 to 135 degrees  negative Lachman,  negative anterior drawer,   negative posterior drawer  negative pivot.   Good tibial step-off,   No varus or valgus laxity at 0 or 30 degrees.   Negative lateral joint line tenderness   negative lateral Franklin.   Negative medial joint line tenderness  negative medial Franklin.   No evidence of any posterolateral instability.   No patellofemoral pain.   Negative compression,   negative apprehension  no effusion.   Calves Are non-tender,  neurovascularly patient is intact.   Negative Homans.   MPFL is non-tender.   Patient Can fully extend the knee.   Good quad tone  No erythema.   Negative Dial test.      The right knee portals have healed  Sutures were removed  Minimal bruising  The right knee has a range of motion of 0 to 135 degrees  negative Lachman,  negative anterior drawer,   negative posterior drawer  negative pivot.   Good tibial step-off,   No varus or valgus laxity at 0 or 30 degrees.   Negative lateral joint line tenderness   negative lateral Franklin.   Positive medial joint line tenderness  Positive medial Franklin.   No evidence of any posterolateral instability.   No patellofemoral pain.   Negative compression,   negative apprehension  no effusion.

## 2024-11-08 ENCOUNTER — TELEPHONE (OUTPATIENT)
Dept: ORTHOPEDIC SURGERY | Age: 44
End: 2024-11-08

## 2024-11-08 NOTE — TELEPHONE ENCOUNTER
He has been seeing Dr. De Los Santos and he is WC. He says he is getting bills for everything. He called the billing office and they said they needed his ins card but again he is WC. He is asking to speak to Kirstin.

## 2024-12-04 ENCOUNTER — OFFICE VISIT (OUTPATIENT)
Age: 44
End: 2024-12-04

## 2024-12-04 DIAGNOSIS — Z48.89 ENCOUNTER FOR POSTOPERATIVE CARE: Primary | ICD-10-CM

## 2024-12-04 PROCEDURE — 99024 POSTOP FOLLOW-UP VISIT: CPT | Performed by: ORTHOPAEDIC SURGERY

## 2024-12-04 NOTE — PROGRESS NOTES
12/4/2024      Selvin Fierro  [id number]  1980      DISABILITY RATING    Mr. Fierro has reached maximum medical improvement.  The permanent partial impairment of his right knee related to his workplace injury is a 0% permanent partial impairment according to the AMA Guides to the Evaluation of Permanent Impairment, Sixth Edition.  This corresponds to a neuro % whole body impairment.   He can return to work regular duty.    Please feel free to contact my office for any further questions.      Allen De Los Santos Jr., MD      
non-tender,  neurovascularly patient is intact.   Negative Homans.   MPFL is non-tender.   Patient Can fully extend the knee.   Good quad tone  No erythema.   Negative Dial test.            Data Reviewed:          MRI right knee dated 9/30/2024 demonstrates the ACL PCL and lateral meniscus to be intact.  There is a complex tear of the posterior horn of the medial meniscus.  There is a bone bruise in the medial femoral condyle.  There is chondromalacia in the medial and patellofemoral compartments.  There is a cyst posterior to the PCL           Impression:   1. Encounter for postoperative care      Stable status post arthroscopy right knee partial medial meniscectomy debridement of a ganglion cyst 6 weeks  Chondromalacia right knee    Plan:   I discussed the plan with the patient.  He has had a fabulous result.  From my standpoint he has reached maximum medical improvement.  Please see the associated disability rating.  He can return to work regular duty.  I will recheck him back on an as-needed basis    Follow up: Return if symptoms worsen or fail to improve.         NURSE :  A nurse  was held with the patient and the nurse  present.    We discussed the patient's current condition, work restrictions, and treatment plan.  Approximate length of meeting 5-10 minutes.  [id number]        Copy this note to Owen Loco  nurse           RAPHAEL GARCIA JR, MD

## (undated) DEVICE — BANDAGE COMPR W6INXL12FT SMOOTH FOR LIMB EXSANG ESMARCH

## (undated) DEVICE — KNEE ARTHROSCOPY KIT DR.POSTA: Brand: MEDLINE INDUSTRIES, INC.

## (undated) DEVICE — DRAPE,ORTHOMAX,ARTHRO T ,W/ POUCH: Brand: MEDLINE

## (undated) DEVICE — PAD,ABDOMINAL,5"X9",ST,LF,25/BX: Brand: MEDLINE INDUSTRIES, INC.

## (undated) DEVICE — GLOVE SURG SZ 85 CRM LTX FREE POLYISOPRENE POLYMER BEAD ANTI

## (undated) DEVICE — BANDAGE COMPR 396IN LEN 6IN W 1 LAYR CLP CLSR COT E W/ CLP

## (undated) DEVICE — PADDING CAST W6INXL4YD COT LO LINTING WYTEX

## (undated) DEVICE — SPONGE GZ W4XL4IN COT 12 PLY TYP VII WVN C FLD DSGN STERILE

## (undated) DEVICE — SOLUTION IRRIG 3000ML 0.9% SOD CHL USP UROMATIC PLAS CONT

## (undated) DEVICE — ZIMMER® STERILE DISPOSABLE TOURNIQUET CUFF WITH PLC, DUAL PORT, SINGLE BLADDER, 30 IN. (76 CM)

## (undated) DEVICE — PATIENT-USE TUBING, DO NOT USE IF PACKAGE IS DAMAGED: Brand: CROSSFLOW

## (undated) DEVICE — YANKAUER,BULB TIP,W/O VENT,RIGID,STERILE: Brand: MEDLINE